# Patient Record
Sex: FEMALE | Race: WHITE
[De-identification: names, ages, dates, MRNs, and addresses within clinical notes are randomized per-mention and may not be internally consistent; named-entity substitution may affect disease eponyms.]

---

## 2017-02-03 ENCOUNTER — HOSPITAL ENCOUNTER (INPATIENT)
Dept: HOSPITAL 53 - M ICU | Age: 62
LOS: 1 days | Discharge: HOME | DRG: 201 | End: 2017-02-04
Attending: INTERNAL MEDICINE | Admitting: HOSPITALIST
Payer: COMMERCIAL

## 2017-02-03 VITALS — SYSTOLIC BLOOD PRESSURE: 107 MMHG | DIASTOLIC BLOOD PRESSURE: 62 MMHG

## 2017-02-03 VITALS — HEIGHT: 61 IN | WEIGHT: 216.49 LBS | BODY MASS INDEX: 40.87 KG/M2

## 2017-02-03 DIAGNOSIS — R11.2: ICD-10-CM

## 2017-02-03 DIAGNOSIS — E78.5: ICD-10-CM

## 2017-02-03 DIAGNOSIS — I10: ICD-10-CM

## 2017-02-03 DIAGNOSIS — T62.8X1A: ICD-10-CM

## 2017-02-03 DIAGNOSIS — Z79.899: ICD-10-CM

## 2017-02-03 DIAGNOSIS — I47.1: Primary | ICD-10-CM
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
## 2017-02-03 DIAGNOSIS — R19.7: ICD-10-CM

## 2017-02-03 DIAGNOSIS — E66.9: ICD-10-CM

## 2017-02-03 LAB
BASOPHILS # BLD AUTO: 0.1 K/MM3 (ref 0–0.2)
BASOPHILS NFR BLD AUTO: 0.8 % (ref 0–1)
EOSINOPHIL # BLD AUTO: 0.1 K/MM3 (ref 0–0.5)
EOSINOPHIL NFR BLD AUTO: 0.9 % (ref 0–3)
ERYTHROCYTE [DISTWIDTH] IN BLOOD BY AUTOMATED COUNT: 13.4 % (ref 11.5–14.5)
LARGE UNSTAINED CELL #: 0.2 K/MM3 (ref 0–0.4)
LARGE UNSTAINED CELL %: 1.8 % (ref 0–4)
LYMPHOCYTES # BLD AUTO: 0.9 K/MM3 (ref 1.5–4.5)
LYMPHOCYTES NFR BLD AUTO: 7.4 % (ref 24–44)
MCH RBC QN AUTO: 29.6 PG (ref 27–33)
MCHC RBC AUTO-ENTMCNC: 33.1 G/DL (ref 32–36.5)
MCV RBC AUTO: 89.5 FL (ref 80–96)
MONOCYTES # BLD AUTO: 0.7 K/MM3 (ref 0–0.8)
MONOCYTES NFR BLD AUTO: 6.8 % (ref 0–5)
NEUTROPHILS # BLD AUTO: 8 K/MM3 (ref 1.8–7.7)
NEUTROPHILS NFR BLD AUTO: 82.4 % (ref 36–66)
PLATELET # BLD AUTO: 149 K/MM3 (ref 150–450)
WBC # BLD AUTO: 9.7 K/MM3 (ref 4–10)

## 2017-02-03 RX ADMIN — ASPIRIN SCH MG: 81 TABLET ORAL at 09:00

## 2017-02-04 VITALS — SYSTOLIC BLOOD PRESSURE: 110 MMHG | DIASTOLIC BLOOD PRESSURE: 69 MMHG

## 2017-02-04 VITALS — SYSTOLIC BLOOD PRESSURE: 94 MMHG | DIASTOLIC BLOOD PRESSURE: 66 MMHG

## 2017-02-04 VITALS — SYSTOLIC BLOOD PRESSURE: 107 MMHG | DIASTOLIC BLOOD PRESSURE: 69 MMHG

## 2017-02-04 LAB
ALBUMIN SERPL BCG-MCNC: 3.6 GM/DL (ref 3.2–5.2)
ALBUMIN/GLOB SERPL: 0.95 {RATIO} (ref 1–1.93)
ALP SERPL-CCNC: 90 U/L (ref 45–117)
ALT SERPL W P-5'-P-CCNC: 51 U/L (ref 12–78)
ANION GAP SERPL CALC-SCNC: 11 MEQ/L (ref 8–16)
ANION GAP SERPL CALC-SCNC: 9 MEQ/L (ref 8–16)
AST SERPL-CCNC: 35 U/L (ref 15–37)
BILIRUB SERPL-MCNC: 1 MG/DL (ref 0.2–1)
BUN SERPL-MCNC: 16 MG/DL (ref 7–18)
BUN SERPL-MCNC: 17 MG/DL (ref 7–18)
CALCIUM SERPL-MCNC: 8.5 MG/DL (ref 8.8–10.2)
CALCIUM SERPL-MCNC: 8.6 MG/DL (ref 8.8–10.2)
CHLORIDE SERPL-SCNC: 105 MEQ/L (ref 98–107)
CHLORIDE SERPL-SCNC: 105 MEQ/L (ref 98–107)
CO2 SERPL-SCNC: 27 MEQ/L (ref 21–32)
CO2 SERPL-SCNC: 27 MEQ/L (ref 21–32)
CREAT SERPL-MCNC: 0.86 MG/DL (ref 0.55–1.02)
CREAT SERPL-MCNC: 0.97 MG/DL (ref 0.55–1.02)
ERYTHROCYTE [DISTWIDTH] IN BLOOD BY AUTOMATED COUNT: 13.3 % (ref 11.5–14.5)
EST. AVERAGE GLUCOSE BLD GHB EST-MCNC: 123 MG/DL (ref 60–110)
GFR SERPL CREATININE-BSD FRML MDRD: > 60 ML/MIN/{1.73_M2} (ref 45–?)
GFR SERPL CREATININE-BSD FRML MDRD: > 60 ML/MIN/{1.73_M2} (ref 45–?)
GLUCOSE SERPL-MCNC: 107 MG/DL (ref 80–110)
GLUCOSE SERPL-MCNC: 120 MG/DL (ref 80–110)
MAGNESIUM SERPL-MCNC: 1.6 MG/DL (ref 1.8–2.4)
MAGNESIUM SERPL-MCNC: 1.6 MG/DL (ref 1.8–2.4)
MCH RBC QN AUTO: 29.5 PG (ref 27–33)
MCHC RBC AUTO-ENTMCNC: 32.9 G/DL (ref 32–36.5)
MCV RBC AUTO: 89.7 FL (ref 80–96)
PLATELET # BLD AUTO: 151 K/MM3 (ref 150–450)
POTASSIUM SERPL-SCNC: 3.7 MEQ/L (ref 3.5–5.1)
POTASSIUM SERPL-SCNC: 3.8 MEQ/L (ref 3.5–5.1)
PROT SERPL-MCNC: 7.4 GM/DL (ref 6.4–8.2)
SODIUM SERPL-SCNC: 141 MEQ/L (ref 136–145)
SODIUM SERPL-SCNC: 143 MEQ/L (ref 136–145)
T3RU NFR SERPL: 30 % (ref 30–39)
T4 SERPL-MCNC: 7 UG/DL (ref 4.5–12)
WBC # BLD AUTO: 7.1 K/MM3 (ref 4–10)

## 2017-02-04 RX ADMIN — SODIUM CHLORIDE SCH UNITS: 4.5 INJECTION, SOLUTION INTRAVENOUS at 06:10

## 2017-02-04 RX ADMIN — SODIUM CHLORIDE SCH UNITS: 4.5 INJECTION, SOLUTION INTRAVENOUS at 00:26

## 2017-02-04 RX ADMIN — ASPIRIN SCH MG: 81 TABLET ORAL at 09:03

## 2017-02-04 NOTE — DSES
DATE OF ADMISSION:  02/03/2017

DATE OF DISCHARGE: 02/04/2017

 

PRIMARY CARE PROVIDER: Gayla Adair.

 

CONSULTANT: Cardiologist, Dr. Salinas.

 

PROCEDURES: None.

 

COMPLICATIONS: None.

 

ADMISSION/DISCHARGE DIAGNOSES:

1. Supraventricular tachycardia.

2. Hypertension.

3. Dyslipidemia.

4. Severe obesity.

5. Nausea and vomiting and diarrhea.

 

HOSPITALIZATION COURSE:

The patient is a 61-year-old female who started having nonspecific

gastrointestinal (GI) symptoms, such as nausea, vomiting and diarrhea in the

morning. Due to the acute GI symptoms, the patient was not able to take her beta

blocker and subsequently the patient presented to [facility], and the patient

was found to have supraventricular tachycardia with a rate of 200. Adenosine was

given; however, the patient continued to have a persistent supraventricular

tachycardia and patient started on Cardizem drip, which brought the patient's

heart rate down to 90s, and [facility] requested transfer for telemetry

monitoring at [facility]. Initially the patient was admitted to the

intensive care unit (ICU) for possible hemodynamic instabilities, and the

cardiologist, Dr. Salinas, has been consulted and the patient was seen by Dr. Salinas in the morning.  Medication review adjusted, and the patient vitals remain

stable. After discussing case with the cardiologist, the patient determined

stable for discharge with recommendation to followup with her primary care

provider within 1-2 weeks, and the patient will be followed with cardiologist,

Dr. Roberts, in [facility] within one week after discharge.

 

OBJECTIVE:

VITAL SIGNS: Temperature is 100, pulse 89, respirations 18, blood pressure is

110/69, pulse oximetry 93% on room air.

 

LABORATORY DATA:

WBC 7.1, hemoglobin 15, hematocrit 45.5, platelet count is 151.

Sodium 143, potassium 3.7, chloride 105, carbon dioxide 27, BUN 16, creatinine

0.86, GFR greater than 60, fasting glucose 120, calcium 8.6, magnesium 1.6.

 

MICROBIOLOGY:

MRSA screening pending. GI panel was not able to be obtained due to lack of stool

sample.

 

DISCHARGE MEDICATIONS:

- aspirin 81 mg by mouth daily

- metoprolol 25 mg by mouth twice a day

- Zofran 4 mg by mouth every six hours as needed for nausea and vomiting

- atorvastatin 20 mg by mouth daily

- omeprazole 20 mg by mouth daily

 

DISCHARGE INSTRUCTIONS:

Discontinue lines. Discharge home. Activity as tolerated. Low-salt diet as

tolerated. Patient is instructed to followup with her primary care provider

within 1-2 weeks. The patient is to followup with the cardiologist , Dr. Roberts, in

[facility] within one week.

 

DISCHARGE TIME: Greater than 30 minutes.

 

CONDITION ON DISCHARGE: Stable.

## 2017-02-04 NOTE — HPE
DATE OF ADMISSION:  02/03/2017

 

PRIMARY CARE PROVIDER:  SHERRILL Swartz

 

CHIEF COMPLAINT: Nausea, vomiting, shortness of breath and palpitations.

 

HISTORY OF THE PRESENT ILLNESS: This is a 61-year-old female patient with

underlying medical history of hypertension, dyslipidemia, conjunctivitis with

questionable history of tachycardia many years ago. Early this morning at 5:00

a.m., the patient started having nausea and vomiting, nonbloody, nonbilious, 
very

mucousy, clear liquid, for five times since 5:00 a.m. this morning, with also

shortness of breath and diarrhea times two, nonbloody, nonbilious, watery.

Subsequently, after the patient started vomiting, the patient was not able to

take her beta blockers and developed palpitations with dryness sensation in her

chest, discomfort ranging from about 4 out of 10 with no relieving or

exacerbating factor. The patient subsequently presented to [facility], was

found to have a supraventricular tachycardia (SVT) of 200, given adenosine, and

the patient intermittently went into sinus tachycardia of 115, subsequently went

back to SVT at 200. Subsequently, the patient was administered Cardizem bolus 
and

placed on Cardizem drip and the patient reverted back to sinus with vital signs

of 117/80 with a heart rate of 90 and subsequently request for transfer for

telemetry monitoring has been made to transfer the patient to [facility]. Initially the patient was brought to the intensive care unit (ICU) given

possibility of hemodynamic instability.  In the ICU, patient was evaluated,

patient had vital signs of heart rate of 80 and sinus rhythm with a blood

pressure of 104/75, pulse oximetry of 96% on room air and the patient was

comfortable. The patient currently denies any chest pain, pressure or 
discomfort.

Denies any fevers or chills. Last episode of vomiting was many hours ago. The

patient feels comfortable. Reported hungry and thirsty. Believes that she can

tolerate oral. Subsequently, Cardizem drip was discontinued, oral metoprolol has

been ordered for the patient. The patient denies any sick contact, recent travel
,

abnormal food or diet. Denies any fevers or chills.

 

ALLERGIES: No known drug allergies reported.

 

PAST MEDICAL HISTORY:

Conjunctivitis.

Pneumothorax a year ago during patient's acute cholecystectomy.

Hypertension.

Dyslipidemia.

 

PAST SURGICAL HISTORY:

Cholecystectomy.

 

SOCIAL HISTORY: The patient lives with a partner. Denies any alcohol drinking,

smoking or illicit drug use.

 

FAMILY HISTORY: Noncontributory.

 

HOME MEDICATION:

- atorvastatin 20 mg by mouth daily

- lisinopril 20 mg by mouth daily

- metoprolol 25 mg by mouth daily

- omeprazole 20 mg by mouth daily

 

REVIEW OF SYSTEMS: 11-point review of systems negative except for those 
mentioned

in the history of the present illness.

 

PHYSICAL EXAMINATION:

VITAL SIGNS: Blood pressure 104/75, pulse 80, respirations 19, pulse oximetry 93
%

on room air.

GENERAL: Patient obese, alert and oriented times three, in no acute distress.

HEENT: Normocephalic, atraumatic.

PULMONARY: Bilaterally clear to auscultation.

CARDIAC: Regular rate and rhythm. Normal S1, S2.

ABDOMEN: Obese, soft, nontender. Positive bowel sounds.

EXTREMITIES: No edema bilateral lower extremities.

 

EKG shows SVT at 200 and also sinus tachycardia at 118. Nonspecific ST segment

changes.

 

LABORATORY: Still pending.  From [facility], the patient had a WBC of 10.8,

hemoglobin and hematocrit 16.4 over 49.4, platelets 169. Chemistry: Sodium 141,

potassium 4.3, chloride 104, bicarbonate 27, BUN of 15, creatinine 1.1

 

ASSESSMENT AND PLAN: This is a 61-year-old female patient with underlying 
medical

history of hypertension, dyslipidemia, admitted for nausea, vomiting, diarrhea

and supraventricular tachycardia.

 

Problems:

1. Supraventricular tachycardia. Will consult cardiology in the morning. 
Followup

cardiac enzymes. Patient switched over from Cardizem to oral metoprolol. Dosage

has been increased to 25 mg by mouth twice a day. Followup cardiac enzymes.

Telemetry monitoring. Followup vital signs. IV fluids for hydration. Followup

thyroid panel, CBCs, followup electrolytes, supplement as needed.

 

2. Nausea, vomiting, diarrhea. IV fluids for hydration. Followup electrolytes,

supplement as needed, Zofran, GI panel. Followup labs.

 

3. Hypertension. Holding lisinopril given patient with borderline normal blood

pressure and metoprolol dose has been increased. Will followup blood pressure,

adjust medication as needed.

 

4. Dyslipidemia. Continue statin.

 

5. Obesity. Followup A1c. Continue to monitor.

 

6. Deep vein thrombosis (DVT) prophylaxis. Heparin subcu.

 

DISPOSITION PLANNING: Pending further workup and cardiology consultation in the

morning and followup blood tests overnight.

IVAN

## 2017-02-04 NOTE — CR
DATE OF CONSULTATION:  2017

 

INDICATION:  Supraventricular tachycardia (SVT).

 

HISTORY OF PRESENT ILLNESS:  Mrs. Anaya is a 61-year-old female who is

previously unknown to me.  She presented to [facility] emergency room

yesterday evening for symptoms of nausea, vomiting and diarrhea.  She tells me

that she had some meatballs the night before that did not sit well with her and

then during the night had episodes of indigestion and also some shortness of

breath.  In the morning, she started having profound diarrhea, nausea and

vomiting of clear liquids.  Eventually, when the symptoms were not subsiding, 
she

came to the emergency room (ER) and was found to have narrow complex tachycardia

with ventricular rate of 200 beats per minute.  She was given adenosine that led

to restoration of sinus mechanism, but she quickly relapsed and a second dose of

adenosine was administered with the same result.  When the SVT relapsed again,

she was given IV Cardizem with prompt restoration of sinus mechanism.  She was

started on IV Cardizem drip.  I was contacted from the emergency room by a nurse

practitioner.  At that point, none of the blood work was back, but I objected to

transfer to our facility because I felt that with SVT she should be able to go

home with just brief observation and oral medications.  Nevertheless, apparently

transfer was arranged later on, and the patient ended up in our intensive care

unit (ICU).  She tells me that she essentially has no symptoms this morning.  
She

still had some diarrhea, but her nausea and vomiting have not been present since

she came to the ER in [facility].  She did have mild discomfort in her chest

and mild shortness of breath during the episode of tachycardia, even though she

did not feel the tachycardia as such.

 

The patient has no prior cardiac history.  She tells me that she had an episode

of SVT probably about 15 or 20 years ago.  She was then maintained on metoprolol

25 mg daily, but she did not take the dose yesterday because of her nausea and

vomiting.  Otherwise, she denies any history of chest pain, syncope or

palpitations.  At her baseline, she is not very active.

 

PAST MEDICAL HISTORY:

1.  Hypertension.

2.  Dyslipidemia.

3.  Obesity.

4.  The patient denies history of sleep apnea.

 

PAST SURGICAL HISTORY:  The patient had cholecystectomy apparently complicated 
by

right-sided pneumothorax years ago.

 

SOCIAL HISTORY:  The patient is single, lives with her partner.  She has one son

who lives nearby.  She never smoked.  She does not drink alcohol.  It appears 

that she is disabled, even though it is not quite clear to me what is the

nature of her disability.  The patient is very vague about this.

 

FAMILY HISTORY:  Her father  of alcoholism.  Her mother  in her 80s of

"natural causes."  The patient denies any history of coronary artery disease 

in first dgr. relatives..

 

HOME MEDICATIONS:

-   Lipitor 20 mg a day

-  lisinopril 20 mg a day

-  metoprolol 25 mg a day

-  omeprazole

 

REVIEW OF SYSTEMS:  She denies any recent fever, chills, nausea, vomiting and

diarrhea until the onset of history of present illness.  She has no history of

stroke.  No prior cardiac problems, other than possible SVT as above.  She 
denies

any paroxysmal  nocturnal dyspnea (PND) or orthopnea.  Her partner says that she

snores loudly and he even notices occasional pauses during breathing.  No prior

gastrointestinal symptoms other than cholecystectomy years ago.  No symptoms to

suggest urinary tract infection (UTI).  No peripheral edema.

 

PHYSICAL EXAMINATION:  Mrs. Anaya is a 61-year-old female who appears

approximately her age or maybe slightly older.

Her vital signs reveal blood pressure 110/69.  Heart rate has been in 80s.  She

is afebrile, even though one temperature was documented at 100.0.  Saturation is

93-96% on room air.  Her weight was documented at 98.2 kg.

She is alert and oriented and appropriate.

Her jugular venous pulse (JVP) is not high.  I do not appreciate goiter.

Lungs are clear to auscultation with good air movement.

Heart:  Exam reveals regular rhythm without gallop, rub or murmur.

Abdomen is obese, but soft.  There is no tenderness or rebound tenderness.  I do

not appreciate hepatosplenomegaly.  Bowel sounds are slightly hyperactive.

There is trace peripheral edema.

Neurologically, she is intact.

 

LABORATORY:  Basic metabolic panel is normal, but for glucose of 120.  
Hemoglobin

A1c was 5.9.  She has a little bit low magnesium at 1.6.  Her troponin, the

initial one in our facility, was 0.13, and the second one of 0.10, it was normal

in [facility].  TSH was 1.6.  We do not have a 12-lead ECG in sinus rhythm

and I am ordering one now.  I reviewed the EKG from SVT and there is no apparent

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

 

ASSESSMENT/PLAN:  Mrs. Anaya is a 61-year-old female who presented with SVT

yesterday after she developed probable gastroenteritis, I suspect due to food

poisoning.  Her symptoms of gastroenteritis are subsiding and she is

essentially back to her normal even though she still had one loose bowel 
movement

this morning.  As far as the SVT is concerned, it is generally not considered

dangerous arrhythmia.  I would increase the dose of metoprolol to 25 mg twice a

day on an outpatient basis.  Simultaneously, I would discontinue her ACE

inhibitor.  She had a very marginal troponin elevation, which I suspect was not

due to a thrombotic event, but rather to the effect of being tachycardiac for at

least 12 hours.  I still would to send her home on baby aspirin.  Her remaining

medications can be continued.  I will arrange for her to have outpatient 
followup

with Dr. Roberts in [facility].

IVAN

## 2017-02-05 NOTE — ECHO
DATE OF PROCEDURE:  02/04/2017

 

REFERRING PHYSICIAN: Dr. Hua.

 

INDICATION: SVT abnormal EKG.

 

The patient measures 61 inches and weighs 98 kilograms.

 

DIMENSIONS:

IVS - 1.0

LV - 4.5

LVPW - 1.0

LA -5.0

Aorta - 3.8

Ascending aorta - 4.0

RV - 3.1

LV systolic -

 

FINDINGS: The study is of fair technical quality corresponding to patient's body

habitus.  Left ventricle is normal size and probably normal systolic function but

the visualization was poor. Right ventricle does not appear grossly enlarged.

Left atrium is severely enlarged. Right atrium is poorly visualized.  Aortic

valve is minimally sclerotic but has normal mobility.  Mitral and tricuspid

valves appear grossly normal.  Pulmonic valve was poorly seen but grossly  also

appears normal.  No pericardial effusion is noted. Inferior vena cava is normal

size.  Aortic root and aortic arch appear normal.  Abdominal aorta was not

visualized.

 

Doppler interrogation reveals no aortic stenosis or insufficiency.  There is

trace mitral insufficiency.  There is no significant tricuspid insufficiency or

pulmonic insufficiency.

 

Mitral inflow pattern and tissue Doppler imaging on mitral annulus reveal grade 2

diastolic dysfunction (tissue Doppler velocities of septal and lateral mitral

annulus are 5.6 and 7.4 cm respectively).

 

CONCLUSIONS:

1.  The study is of fair technical quality.

2.  Normal LV size and systolic function, grade 2 diastolic dysfunction.

3.  No significant valvular disease.

4.  Normal central venous pressure.

5.  Unable to estimate pulmonary artery pressure.

6.  Mildly dilated ascending aorta (4.0 cm).

 

COMMENT: SBE prophylaxis is not recommended.

## 2017-02-05 NOTE — ECGEPIP
Stationary ECG Study

                              [facility]

                                       

                                       Test Date:    2017

Pat Name:     CORNELIA TORRES        Department:   

Patient ID:   [id number]                 Room:         [place] [address]

Gender:       F                        Technician:   TUAN

:          1955               Requested By: Roel Salinas 

Order Number: [id number]     Reading MD:   Roel Salinas

                                 Measurements

Intervals                              Axis          

Rate:         79                       P:            47

ID:           [id number]                      QRS:          27

QRSD:         86                       T:            40

QT:           378                                    

QTc:          434                                    

                           Interpretive Statements

SINUS RHYTHM

NO PRIOR 

Electronically Signed On 2017 11:20:52 EST by Roel Salinas

## 2022-06-14 ENCOUNTER — HOSPITAL ENCOUNTER (OUTPATIENT)
Dept: HOSPITAL 53 - M SOG | Age: 67
End: 2022-06-14
Attending: ORTHOPAEDIC SURGERY
Payer: COMMERCIAL

## 2022-06-14 DIAGNOSIS — S82.002A: Primary | ICD-10-CM

## 2022-06-23 ENCOUNTER — HOSPITAL ENCOUNTER (OUTPATIENT)
Dept: HOSPITAL 53 - M SOG | Age: 67
End: 2022-06-23
Attending: ORTHOPAEDIC SURGERY
Payer: COMMERCIAL
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
## 2022-06-23 DIAGNOSIS — S82.002A: Primary | ICD-10-CM
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
## 2022-12-20 ENCOUNTER — HOSPITAL ENCOUNTER (OUTPATIENT)
Dept: HOSPITAL 53 - M WHCPRO | Age: 67
End: 2022-12-20
Attending: SURGERY
Payer: COMMERCIAL

## 2022-12-20 VITALS — DIASTOLIC BLOOD PRESSURE: 86 MMHG | SYSTOLIC BLOOD PRESSURE: 132 MMHG

## 2022-12-20 DIAGNOSIS — R92.8: Primary | ICD-10-CM

## 2022-12-20 DIAGNOSIS — N63.15: ICD-10-CM

## 2023-05-30 ENCOUNTER — HOSPITAL ENCOUNTER (OUTPATIENT)
Dept: HOSPITAL 53 - M LAB | Age: 68
End: 2023-05-30
Attending: STUDENT IN AN ORGANIZED HEALTH CARE EDUCATION/TRAINING PROGRAM
Payer: MEDICARE

## 2023-05-30 DIAGNOSIS — R91.8: Primary | ICD-10-CM

## 2023-05-30 DIAGNOSIS — Z79.899: ICD-10-CM

## 2023-05-30 LAB
25(OH)D3 SERPL-MCNC: 51 NG/ML (ref 20–100)
BASOPHILS # BLD AUTO: 0.1 10^3/UL (ref 0–0.2)
BASOPHILS NFR BLD AUTO: 1.2 % (ref 0–1)
BUN SERPL-MCNC: 9 MG/DL (ref 9–23)
CALCIUM SERPL-MCNC: 8.4 MG/DL (ref 8.3–10.6)
CHLORIDE SERPL-SCNC: 101 MMOL/L (ref 98–107)
CO2 SERPL-SCNC: 31 MMOL/L (ref 20–31)
CREAT SERPL-MCNC: 0.85 MG/DL (ref 0.55–1.3)
EOSINOPHIL # BLD AUTO: 0.6 10^3/UL (ref 0–0.5)
EOSINOPHIL NFR BLD AUTO: 7.2 % (ref 0–3)
GFR SERPL CREATININE-BSD FRML MDRD: > 60 ML/MIN/{1.73_M2} (ref 45–?)
GLUCOSE SERPL-MCNC: 96 MG/DL (ref 74–106)
HCT VFR BLD AUTO: 43.6 % (ref 36–47)
HGB BLD-MCNC: 13.9 G/DL (ref 12–15.5)
INR PPP: 1.07
LYMPHOCYTES # BLD AUTO: 1.2 10^3/UL (ref 1.5–5)
LYMPHOCYTES NFR BLD AUTO: 14.8 % (ref 24–44)
MCH RBC QN AUTO: 28 PG (ref 27–33)
MCHC RBC AUTO-ENTMCNC: 31.9 G/DL (ref 32–36.5)
MCV RBC AUTO: 87.9 FL (ref 80–96)
MONOCYTES # BLD AUTO: 0.6 10^3/UL (ref 0–0.8)
MONOCYTES NFR BLD AUTO: 7 % (ref 2–8)
NEUTROPHILS # BLD AUTO: 5.4 10^3/UL (ref 1.5–8.5)
NEUTROPHILS NFR BLD AUTO: 69.4 % (ref 36–66)
PLATELET # BLD AUTO: 204 10^3/UL (ref 150–450)
POTASSIUM SERPL-SCNC: 3.7 MMOL/L (ref 3.5–5.1)
PROTHROMBIN TIME: 14.1 SECONDS (ref 12.5–14.5)
RBC # BLD AUTO: 4.96 10^6/UL (ref 4–5.4)
SODIUM SERPL-SCNC: 141 MMOL/L (ref 136–145)
WBC # BLD AUTO: 7.8 10^3/UL (ref 4–10)

## 2023-07-12 ENCOUNTER — HOSPITAL ENCOUNTER (OUTPATIENT)
Dept: HOSPITAL 53 - M SDC | Age: 68
Discharge: HOME | End: 2023-07-12
Attending: STUDENT IN AN ORGANIZED HEALTH CARE EDUCATION/TRAINING PROGRAM
Payer: MEDICARE

## 2023-07-12 VITALS — SYSTOLIC BLOOD PRESSURE: 131 MMHG | DIASTOLIC BLOOD PRESSURE: 72 MMHG | OXYGEN SATURATION: 96 % | TEMPERATURE: 98.2 F

## 2023-07-12 VITALS — BODY MASS INDEX: 35.44 KG/M2 | WEIGHT: 192.6 LBS | HEIGHT: 62 IN

## 2023-07-12 DIAGNOSIS — E11.9: ICD-10-CM

## 2023-07-12 DIAGNOSIS — Z79.84: ICD-10-CM

## 2023-07-12 DIAGNOSIS — R06.02: ICD-10-CM

## 2023-07-12 DIAGNOSIS — I88.9: Primary | ICD-10-CM

## 2023-07-12 DIAGNOSIS — K21.9: ICD-10-CM

## 2023-07-12 DIAGNOSIS — I10: ICD-10-CM

## 2023-07-12 DIAGNOSIS — E78.00: ICD-10-CM

## 2023-07-12 DIAGNOSIS — Z79.899: ICD-10-CM

## 2023-07-12 DIAGNOSIS — Z79.82: ICD-10-CM

## 2023-07-12 DIAGNOSIS — I71.21: ICD-10-CM

## 2023-07-12 PROCEDURE — 31629 BRONCHOSCOPY/NEEDLE BX EACH: CPT

## 2023-07-12 PROCEDURE — 87070 CULTURE OTHR SPECIMN AEROBIC: CPT

## 2023-07-12 PROCEDURE — 88305 TISSUE EXAM BY PATHOLOGIST: CPT

## 2023-07-12 PROCEDURE — 87205 SMEAR GRAM STAIN: CPT

## 2023-07-12 PROCEDURE — 87116 MYCOBACTERIA CULTURE: CPT

## 2023-07-12 PROCEDURE — 31624 DX BRONCHOSCOPE/LAVAGE: CPT

## 2023-07-12 PROCEDURE — 31652 BRONCH EBUS SAMPLNG 1/2 NODE: CPT

## 2023-07-12 PROCEDURE — 87102 FUNGUS ISOLATION CULTURE: CPT

## 2023-07-12 PROCEDURE — 87206 SMEAR FLUORESCENT/ACID STAI: CPT

## 2023-07-12 PROCEDURE — 88173 CYTOPATH EVAL FNA REPORT: CPT

## 2023-08-22 ENCOUNTER — HOSPITAL ENCOUNTER (OUTPATIENT)
Dept: HOSPITAL 53 - M WHC | Age: 68
End: 2023-08-22
Attending: NURSE PRACTITIONER
Payer: MEDICARE

## 2023-08-22 DIAGNOSIS — D24.1: Primary | ICD-10-CM

## 2023-08-22 PROCEDURE — 77065 DX MAMMO INCL CAD UNI: CPT
